# Patient Record
Sex: FEMALE | Race: OTHER | HISPANIC OR LATINO
[De-identification: names, ages, dates, MRNs, and addresses within clinical notes are randomized per-mention and may not be internally consistent; named-entity substitution may affect disease eponyms.]

---

## 2021-11-17 PROBLEM — Z00.00 ENCOUNTER FOR PREVENTIVE HEALTH EXAMINATION: Status: ACTIVE | Noted: 2021-11-17

## 2022-02-23 ENCOUNTER — LABORATORY RESULT (OUTPATIENT)
Age: 47
End: 2022-02-23

## 2022-02-24 ENCOUNTER — APPOINTMENT (OUTPATIENT)
Dept: OBGYN | Facility: CLINIC | Age: 47
End: 2022-02-24
Payer: MEDICAID

## 2022-02-24 VITALS — WEIGHT: 137 LBS | SYSTOLIC BLOOD PRESSURE: 129 MMHG | DIASTOLIC BLOOD PRESSURE: 74 MMHG

## 2022-02-24 DIAGNOSIS — N81.6 CYSTOCELE, UNSPECIFIED: ICD-10-CM

## 2022-02-24 DIAGNOSIS — R92.1 MAMMOGRAPHIC CALCIFICATION FOUND ON DIAGNOSTIC IMAGING OF BREAST: ICD-10-CM

## 2022-02-24 DIAGNOSIS — N81.10 CYSTOCELE, UNSPECIFIED: ICD-10-CM

## 2022-02-24 DIAGNOSIS — Z01.419 ENCOUNTER FOR GYNECOLOGICAL EXAMINATION (GENERAL) (ROUTINE) W/OUT ABNORMAL FINDINGS: ICD-10-CM

## 2022-02-24 PROCEDURE — 99396 PREV VISIT EST AGE 40-64: CPT

## 2022-02-24 NOTE — PHYSICAL EXAM
[Appropriately responsive] : appropriately responsive [Alert] : alert [No Acute Distress] : no acute distress [No Lymphadenopathy] : no lymphadenopathy [Regular Rate Rhythm] : regular rate rhythm [No Murmurs] : no murmurs [Clear to Auscultation B/L] : clear to auscultation bilaterally [Soft] : soft [Non-tender] : non-tender [Non-distended] : non-distended [No HSM] : No HSM [No Lesions] : no lesions [No Mass] : no mass [Oriented x3] : oriented x3 [Examination Of The Breasts] : a normal appearance [No Masses] : no breast masses were palpable [Labia Majora] : normal [Labia Minora] : normal [Cystocele] : a cystocele [Rectocele] : a rectocele [Normal] : normal [Uterine Adnexae] : normal

## 2022-02-24 NOTE — HISTORY OF PRESENT ILLNESS
[FreeTextEntry1] : Pt comes for pap . Discussed the results of the ultrasound . She also has a buldge from her vagina . She has constipation . No incontinence of urine . No irregular bleeding . She want t o fix the buldge from her vagina . I discussed the procedure and complications . She also wants to know if she can stil get pregnant to avoid having a sterilization procedure .

## 2022-03-16 ENCOUNTER — APPOINTMENT (OUTPATIENT)
Dept: OBGYN | Facility: CLINIC | Age: 47
End: 2022-03-16

## 2022-03-17 ENCOUNTER — APPOINTMENT (OUTPATIENT)
Dept: OBGYN | Facility: CLINIC | Age: 47
End: 2022-03-17

## 2022-06-09 ENCOUNTER — RESULT REVIEW (OUTPATIENT)
Age: 47
End: 2022-06-09

## 2024-02-29 ENCOUNTER — APPOINTMENT (OUTPATIENT)
Dept: SURGERY | Facility: CLINIC | Age: 49
End: 2024-02-29
Payer: MEDICAID

## 2024-02-29 VITALS
BODY MASS INDEX: 23.92 KG/M2 | HEIGHT: 63 IN | HEART RATE: 69 BPM | SYSTOLIC BLOOD PRESSURE: 116 MMHG | WEIGHT: 135 LBS | DIASTOLIC BLOOD PRESSURE: 77 MMHG

## 2024-02-29 DIAGNOSIS — Z82.49 FAMILY HISTORY OF ISCHEMIC HEART DISEASE AND OTHER DISEASES OF THE CIRCULATORY SYSTEM: ICD-10-CM

## 2024-02-29 DIAGNOSIS — Z63.5 DISRUPTION OF FAMILY BY SEPARATION AND DIVORCE: ICD-10-CM

## 2024-02-29 DIAGNOSIS — R10.30 LOWER ABDOMINAL PAIN, UNSPECIFIED: ICD-10-CM

## 2024-02-29 DIAGNOSIS — Z56.0 UNEMPLOYMENT, UNSPECIFIED: ICD-10-CM

## 2024-02-29 DIAGNOSIS — Z82.3 FAMILY HISTORY OF STROKE: ICD-10-CM

## 2024-02-29 DIAGNOSIS — K64.9 UNSPECIFIED HEMORRHOIDS: ICD-10-CM

## 2024-02-29 DIAGNOSIS — Z83.2 FAMILY HISTORY OF DISEASES OF THE BLOOD AND BLOOD-FORMING ORGANS AND CERTAIN DISORDERS INVOLVING THE IMMUNE MECHANISM: ICD-10-CM

## 2024-02-29 DIAGNOSIS — Z78.9 OTHER SPECIFIED HEALTH STATUS: ICD-10-CM

## 2024-02-29 DIAGNOSIS — Z80.0 FAMILY HISTORY OF MALIGNANT NEOPLASM OF DIGESTIVE ORGANS: ICD-10-CM

## 2024-02-29 PROCEDURE — 99203 OFFICE O/P NEW LOW 30 MIN: CPT

## 2024-02-29 SDOH — SOCIAL STABILITY - SOCIAL INSECURITY: DISRUPTION OF FAMILY BY SEPARATION AND DIVORCE: Z63.5

## 2024-02-29 SDOH — ECONOMIC STABILITY - INCOME SECURITY: UNEMPLOYMENT, UNSPECIFIED: Z56.0

## 2024-02-29 NOTE — PHYSICAL EXAM
[No Rash or Lesion] : No rash or lesion [Alert] : alert [Oriented to Person] : oriented to person [Oriented to Time] : oriented to time [Oriented to Place] : oriented to place [Calm] : calm [Normal rectal exam] : was normal [Nonprolapsing] : a nonprolapsing (grade I) [External Hemorrhoid] : an external hemorrhoid was present [Normal] : was normal [None] : there was no rectal mass  [Excoriation] : no perianal excoriation [Tender, Swollen] : that was nontender and non-swollen [Thrombosed] : that was not thrombosed [de-identified] : A/Ox3; NAD. appears comfortable [de-identified] : EOMI; sclera anicteric. [de-identified] : no cervical lymphadenopathy  [de-identified] : airway patent, no use of accessory muscles [de-identified] : No chest deformity, No asymmetry, Normal contours,No nodules, No masses, No axillary adenopathy, No nipple discharge,No tenderness  [de-identified] : no defect or hernias noted to the right sided of groin . no masses  [de-identified] : Abd is soft, nondistended, no rebound or guarding. No abdominal masses. No abdominal tenderness. [de-identified] : +ROM, normal gait

## 2024-02-29 NOTE — PLAN
[FreeTextEntry1] : findings were discussed w the patient at length  patient advised to go ahead w US guided core biopsy x 2 to the L and R breasts as per patient, she was under the impression that she would be going for additional imaging for Breast MRI..  however, patient informed that biopsy will be needed for diagnosis R/O malignancy  US guided Core biopsy x2 ordered  no surgical intervention for R side of groin or hemorrhoids indicated.   Patient's questions and concerns addressed to their satisfaction, and patient verbalized an understanding of the information discussed.

## 2024-02-29 NOTE — HISTORY OF PRESENT ILLNESS
[de-identified] : Ms. SAAVEDRA is a 48 year y/o F here for initial consult visit, regarding abnormal breast imaging, performed @[facility], 02/2024. Referred by her GYN, Dr. Elías Han.  Patient BL Breast Mammo, and US, 02/02/24, which had showed indeterminate finding of the L breast at 12:00 and R Breast 6:00 for which US guided core biopsy x 2 recommended, BIRADS 4.  Patient denies feeling any breast masses or lumps to either breast. No personal history of breast CA.  Denies family history of breast CA.   Also c/o R inguinal discomfort and wants to make sure she does not have a defect/hernia.  Complains of hemorrhoids which prolapse, no bleeding or pain. Has rectal prolapse, c/w rectocele. Goes to therapy for relaxation of anal sphincter and pelvic exercises.

## 2024-03-01 ENCOUNTER — APPOINTMENT (OUTPATIENT)
Dept: ULTRASOUND IMAGING | Facility: CLINIC | Age: 49
End: 2024-03-01
Payer: MEDICAID

## 2024-03-01 PROCEDURE — 76830 TRANSVAGINAL US NON-OB: CPT

## 2024-03-21 ENCOUNTER — APPOINTMENT (OUTPATIENT)
Dept: SURGERY | Facility: CLINIC | Age: 49
End: 2024-03-21
Payer: MEDICAID

## 2024-03-21 VITALS
HEART RATE: 70 BPM | WEIGHT: 135 LBS | SYSTOLIC BLOOD PRESSURE: 110 MMHG | DIASTOLIC BLOOD PRESSURE: 68 MMHG | OXYGEN SATURATION: 100 % | BODY MASS INDEX: 23.92 KG/M2 | HEIGHT: 63 IN

## 2024-03-21 DIAGNOSIS — R92.8 OTHER ABNORMAL AND INCONCLUSIVE FINDINGS ON DIAGNOSTIC IMAGING OF BREAST: ICD-10-CM

## 2024-03-21 PROCEDURE — 99203 OFFICE O/P NEW LOW 30 MIN: CPT

## 2024-03-21 NOTE — DATA REVIEWED
[FreeTextEntry1] : Patient Name OMID SAAVEDRA Date of Birth 1975 Procedure MA 3D DIGITAL DIAGNOSTIC MAMMOGRAPHY Study Date & Time 2024-02-02 10:30 AM Patient ID [id number] Accession Number [id number] Referring Physician ZORAN SINGH Institution Name [id number] Report RADIOLOGY REPORT FINDINGS FINDING BILATERAL DIAGNOSTIC MAMMOGRAM AND BILATERAL ULTRASOUND Clinical History: Recall from screening for calcifications in the upper and upper outer right breast and in the outer left breast as well as a nodular asymmetry in the medial left breast on the CC view. Comparison is made to prior studies dating back to: 9/16/2021 Technique: Digital diagnostic views of the bilateral breast(s) utilizing 3-D tomosynthesis.. Complete bilateral breast ultrasound including all 4 quadrants, retroareolar regions and the axillae. FINDINGS: Mammogram: The breasts are heterogeneously dense, which may obscure small masses. Diffusely scattered calcifications throughout both breasts are grossly stable for at least 2 years suggesting benign etiology. Queried nodularity in the medial left breast is mostly pliable/dephasing on spot compression. Ultrasound: Comparison: None available. The breast parenchyma is heterogeneously glandular. Right breast: * 5:00 N2, subcentimeter benign cyst. * 6:00 N1, 7 x 3 x 6 mm hypoechoic mass with slightly irregular margins, newly evident, indeterminate. * 7:00 N2, 5 x 2 x 3 mm circumscribed parallel hypoechoic mass, newly evident, probably benign. 3/19/24, 9:25 AM [employer] [email or web address] * 7:00 N1, subcentimeter benign cyst. * 9:00 N8, 5 mm hypoechoic mass, possibly debris-filled cyst. * Retroareolar, 7 mm benign cyst with debris, stable. * No abnormal axillary lymph nodes. Left breast: * 12:00 N2, 3 x 2 x 3 mm irregular hypoechoic mass, newly evident, indeterminate. * 3:00 periareolar, 10 mm benign cyst cluster. * 11:00 N3, 11 mm benign cyst with debris. * No abnormal axillary lymph nodes. IMPRESSION: * Bilateral calcifications are grossly stable and probably benign. Recommend bilateral diagnostic mammogram follow-up in 6 months. * Indeterminate right breast mass at 6:00. Ultrasound-guided core needle biopsy recommended. * Indeterminate left breast mass at 12:00. Ultrasound-guided core needle biopsy recommended. * Probably benign right breast mass at 7:00. Recommend targeted ultrasound follow-up in 6 months if the biopsy is benign. Recommendation: Ultrasound Biopsy Our staff will attempt to contact the clinician's office to confirm receipt of this report. BI-RADS CATEGORY: 4 - Suspicious Abnormality A letter has been sent to the patient with the above recommendations. The [place] Department of Health requires us to indicate the date of the patient's last clinical breast examination. Approximately 10% of breast carcinomas are not radiographically detectable. A negative report should not delay biopsy if a clinically suspicious mass is present. Dense breasts may obscure an underlying neoplasm. Electronically signed by: Hever Haile MD 2/2/2024 12:14 PM Workstation: Managed by [email or web address] Electronically Signed By: Hever Haile MD Sign Date: 02-FEB-24 [facility]

## 2024-03-21 NOTE — PHYSICAL EXAM
[Oriented to Person] : oriented to person [Alert] : alert [Oriented to Place] : oriented to place [Oriented to Time] : oriented to time [Calm] : calm [de-identified] : She  is alert, well-groomed, and in NAD   [de-identified] : anicteric.  Nasal mucosa pink, septum midline. Oral mucosa pink.  Tongue midline, Pharynx without exudates.   [de-identified] : Neck supple. Trachea midline. Thyroid isthmus barely palpable, lobes not felt.   [de-identified] :   Breast are symmetric,  No palpable nodules, masses, tenderness, or axillary or supraclavicular adenopathy. No nipple discharge. no skin retraction

## 2024-03-21 NOTE — CONSULT LETTER
[Dear  ___] : Dear  [unfilled], [Consult Letter:] : I had the pleasure of evaluating your patient, [unfilled]. [Please see my note below.] : Please see my note below. [Sincerely,] : Sincerely, [Consult Closing:] : Thank you very much for allowing me to participate in the care of this patient.  If you have any questions, please do not hesitate to contact me. [FreeTextEntry3] : Saravanan Peña MD, FACS

## 2024-03-21 NOTE — PLAN
[FreeTextEntry1] : Ms. SAAVEDRA  is presenting  today for an evaluation .  she is doing well and offers no complaints.  Results of  her  breast imaging and physical examination findings were discussed in details.   MRI is not indicated at this time. Significance of the findings were discussed.    She  was advised to have  BL    breast US guided biopsy   and return after the tests. Importance of monthly self-breast examination was reinforced.  Patient's questions and concerns addressed to patient's satisfaction.

## 2024-03-21 NOTE — HISTORY OF PRESENT ILLNESS
[de-identified] :    Patient is a 48 year -old female  who was referred by Dr. Elías Han  with the chief complaint of having  BL breast masses.  She  denies any trauma to the breast, denies  skin changes  and She has no spontaneous nipple discharge. She  denies any fever, night sweats or loss of appetite.    There is no family history of breast carcinoma.   Menarche at age 12, -6, para-1.   Date of  her last menstrual period: February, irregular .  Patient is on no hormonal replacement therapy.   Patient  had a BL breast US and a  mammogram on 2024,   that was deemed a BIRADS 4 and showed  Indeterminate Right breast mass at 6:00. Ultrasound-guided core needle biopsy recommended.   Indeterminate Left breast mass at 12:00. Ultrasound-guided core needle biopsy recommended. Patient is asking if she could have MRI instead of breast biopsies .

## 2024-04-01 ENCOUNTER — NON-APPOINTMENT (OUTPATIENT)
Age: 49
End: 2024-04-01

## 2024-04-19 ENCOUNTER — APPOINTMENT (OUTPATIENT)
Dept: ULTRASOUND IMAGING | Facility: HOSPITAL | Age: 49
End: 2024-04-19
Payer: MEDICAID

## 2024-04-19 ENCOUNTER — OUTPATIENT (OUTPATIENT)
Dept: OUTPATIENT SERVICES | Facility: HOSPITAL | Age: 49
LOS: 1 days | End: 2024-04-19

## 2024-04-19 ENCOUNTER — RESULT REVIEW (OUTPATIENT)
Age: 49
End: 2024-04-19

## 2024-04-19 PROCEDURE — 77065 DX MAMMO INCL CAD UNI: CPT

## 2024-04-19 PROCEDURE — 19083 BX BREAST 1ST LESION US IMAG: CPT

## 2024-04-19 PROCEDURE — 77065 DX MAMMO INCL CAD UNI: CPT | Mod: 26,LT

## 2024-04-19 PROCEDURE — A4648: CPT

## 2024-04-19 PROCEDURE — 19084 BX BREAST ADD LESION US IMAG: CPT | Mod: RT

## 2024-04-19 PROCEDURE — 19083 BX BREAST 1ST LESION US IMAG: CPT | Mod: LT

## 2024-04-19 PROCEDURE — 88305 TISSUE EXAM BY PATHOLOGIST: CPT

## 2024-04-19 PROCEDURE — 88305 TISSUE EXAM BY PATHOLOGIST: CPT | Mod: 26

## 2024-04-19 PROCEDURE — 19084 BX BREAST ADD LESION US IMAG: CPT

## 2024-04-22 LAB — SURGICAL PATHOLOGY STUDY: SIGNIFICANT CHANGE UP

## 2024-04-23 ENCOUNTER — APPOINTMENT (OUTPATIENT)
Dept: ULTRASOUND IMAGING | Facility: CLINIC | Age: 49
End: 2024-04-23

## 2024-04-29 ENCOUNTER — NON-APPOINTMENT (OUTPATIENT)
Age: 49
End: 2024-04-29

## 2024-08-13 NOTE — ASSESSMENT
[FreeTextEntry1] : IMP: 49 yo F here for initial consult regarding abnormal finding on breast imaging;  BL Breast Mammo and US, 02/02/24, which had showed indeterminate finding of the L breast at 12:00 and R Breast 6:00 for which US guided core biopsy x 2 recommended, BIRADS 4. family informed